# Patient Record
Sex: FEMALE | Race: WHITE | NOT HISPANIC OR LATINO | URBAN - METROPOLITAN AREA
[De-identification: names, ages, dates, MRNs, and addresses within clinical notes are randomized per-mention and may not be internally consistent; named-entity substitution may affect disease eponyms.]

---

## 2020-11-01 ENCOUNTER — EMERGENCY (EMERGENCY)
Facility: HOSPITAL | Age: 35
LOS: 1 days | Discharge: HOME | End: 2020-11-01
Admitting: EMERGENCY MEDICINE
Payer: COMMERCIAL

## 2020-11-01 VITALS
OXYGEN SATURATION: 98 % | RESPIRATION RATE: 18 BRPM | SYSTOLIC BLOOD PRESSURE: 136 MMHG | TEMPERATURE: 98 F | DIASTOLIC BLOOD PRESSURE: 93 MMHG | HEART RATE: 76 BPM

## 2020-11-01 VITALS
HEART RATE: 73 BPM | RESPIRATION RATE: 18 BRPM | TEMPERATURE: 99 F | DIASTOLIC BLOOD PRESSURE: 88 MMHG | OXYGEN SATURATION: 99 % | SYSTOLIC BLOOD PRESSURE: 138 MMHG
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
## 2020-11-01 DIAGNOSIS — Z23 ENCOUNTER FOR IMMUNIZATION: ICD-10-CM

## 2020-11-01 DIAGNOSIS — Z20.828 CONTACT WITH AND (SUSPECTED) EXPOSURE TO OTHER VIRAL COMMUNICABLE DISEASES: ICD-10-CM

## 2020-11-01 DIAGNOSIS — R51.9 HEADACHE, UNSPECIFIED: ICD-10-CM

## 2020-11-01 DIAGNOSIS — R11.0 NAUSEA: ICD-10-CM

## 2020-11-01 DIAGNOSIS — M79.18 MYALGIA, OTHER SITE: ICD-10-CM

## 2020-11-01 DIAGNOSIS — S00.03XA CONTUSION OF SCALP, INITIAL ENCOUNTER: ICD-10-CM
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
## 2020-11-01 PROCEDURE — 73610 X-RAY EXAM OF ANKLE: CPT | Mod: 26,LT

## 2020-11-01 PROCEDURE — 72125 CT NECK SPINE W/O DYE: CPT | Mod: 26

## 2020-11-01 PROCEDURE — 99284 EMERGENCY DEPT VISIT MOD MDM: CPT

## 2020-11-01 PROCEDURE — 72131 CT LUMBAR SPINE W/O DYE: CPT | Mod: 26

## 2020-11-01 PROCEDURE — 70450 CT HEAD/BRAIN W/O DYE: CPT | Mod: 26

## 2020-11-01 RX ORDER — METHOCARBAMOL 500 MG/1
2 TABLET, FILM COATED ORAL
Qty: 30 | Refills: 0
Start: 2020-11-01 | End: 2020-11-05

## 2020-11-01 RX ORDER — TETANUS TOXOID, REDUCED DIPHTHERIA TOXOID AND ACELLULAR PERTUSSIS VACCINE, ADSORBED 5; 2.5; 8; 8; 2.5 [IU]/.5ML; [IU]/.5ML; UG/.5ML; UG/.5ML; UG/.5ML
0.5 SUSPENSION INTRAMUSCULAR ONCE
Refills: 0 | Status: COMPLETED | OUTPATIENT
Start: 2020-11-01 | End: 2020-11-01

## 2020-11-01 RX ORDER — IBUPROFEN 200 MG
600 TABLET ORAL ONCE
Refills: 0 | Status: COMPLETED | OUTPATIENT
Start: 2020-11-01 | End: 2020-11-01

## 2020-11-01 RX ADMIN — Medication 600 MILLIGRAM(S): at 13:42

## 2020-11-01 RX ADMIN — Medication 600 MILLIGRAM(S): at 12:40

## 2020-11-01 RX ADMIN — TETANUS TOXOID, REDUCED DIPHTHERIA TOXOID AND ACELLULAR PERTUSSIS VACCINE, ADSORBED 0.5 MILLILITER(S): 5; 2.5; 8; 8; 2.5 SUSPENSION INTRAMUSCULAR at 12:40

## 2020-11-01 NOTE — ED PROVIDER NOTE - NEUROLOGICAL, MLM
clear and coherent speech, normal cranial nerve exam, normal finger to nose and JORGE ALBERTO.  Steady gait.  No pronator drift.

## 2020-11-01 NOTE — ED ADULT NURSE NOTE - CHPI ED NUR SYMPTOMS NEG
no deformity/no vomiting/no loss of consciousness/no fever/no numbness/no tingling/no weakness/no confusion/no bleeding/no abrasion
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
## 2020-11-01 NOTE — ED PROVIDER NOTE - MUSCULOSKELETAL, MLM
no point spinal tenderness, no stepoff, area of ecchymosis to R paralumbar area, no chest wall tenderness, no flail chest, hip stable, +L ankle edema and tenderness, able to weight bear
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
## 2020-11-01 NOTE — ED PROVIDER NOTE - PATIENT PORTAL LINK FT
You can access the FollowMyHealth Patient Portal offered by [facility] by registering at the following website: [email or web address]. By joining [employer]’s FollowMyHealth portal, you will also be able to view your health information using other applications (apps) compatible with our system.

## 2020-11-02 LAB — SARS-COV-2 RNA SPEC QL NAA+PROBE: SIGNIFICANT CHANGE UP

## 2020-11-22 ENCOUNTER — EMERGENCY (EMERGENCY)
Facility: HOSPITAL | Age: 35
LOS: 1 days | Discharge: HOME | End: 2020-11-22
Admitting: EMERGENCY MEDICINE
Payer: COMMERCIAL

## 2020-11-22 VITALS
TEMPERATURE: 99 F | DIASTOLIC BLOOD PRESSURE: 85 MMHG | OXYGEN SATURATION: 97 % | HEART RATE: 70 BPM | RESPIRATION RATE: 16 BRPM | SYSTOLIC BLOOD PRESSURE: 136 MMHG

## 2020-11-22 DIAGNOSIS — Z20.828 CONTACT WITH AND (SUSPECTED) EXPOSURE TO OTHER VIRAL COMMUNICABLE DISEASES: ICD-10-CM

## 2020-11-22 PROCEDURE — 99283 EMERGENCY DEPT VISIT LOW MDM: CPT

## 2020-12-19 ENCOUNTER — EMERGENCY (EMERGENCY)
Facility: HOSPITAL | Age: 35
LOS: 1 days | Discharge: HOME | End: 2020-12-19
Attending: EMERGENCY MEDICINE | Admitting: EMERGENCY MEDICINE
Payer: COMMERCIAL

## 2020-12-19 VITALS
OXYGEN SATURATION: 97 % | HEART RATE: 70 BPM | RESPIRATION RATE: 18 BRPM | TEMPERATURE: 98 F | DIASTOLIC BLOOD PRESSURE: 85 MMHG | SYSTOLIC BLOOD PRESSURE: 119 MMHG

## 2020-12-19 DIAGNOSIS — Z20.828 CONTACT WITH AND (SUSPECTED) EXPOSURE TO OTHER VIRAL COMMUNICABLE DISEASES: ICD-10-CM

## 2020-12-19 PROCEDURE — 99283 EMERGENCY DEPT VISIT LOW MDM: CPT

## 2020-12-19 NOTE — ED PROVIDER NOTE - OBJECTIVE STATEMENT
35 female, presenting to the ED requesting COVID-19 screening. Patient is currently asymptomatic and has no other medical complaints at this time. Denies fever, chills, chest pain, SOB.

## 2020-12-19 NOTE — ED PROVIDER NOTE - PATIENT PORTAL LINK FT
You can access the FollowMyHealth Patient Portal offered by [facility] by registering at the following website: [email or web address]. By joining [employer]’s FollowMyHealth portal, you will also be able to view your health information using other applications (apps) compatible with our system.

## 2020-12-19 NOTE — ED PROVIDER NOTE - CLINICAL SUMMARY MEDICAL DECISION MAKING FREE TEXT BOX
Asymptomatic patient here for COVID screening. Risk of exposure is very low. Reviewed vitals and testing plan with the patient. Encouraged to download the follow my health radha for test results.

## 2020-12-20 LAB
SARS-COV-2 IGG SERPL QL IA: NEGATIVE — SIGNIFICANT CHANGE UP
SARS-COV-2 IGM SERPL IA-ACNC: <0.1 INDEX — SIGNIFICANT CHANGE UP

## 2021-05-05 ENCOUNTER — APPOINTMENT (OUTPATIENT)
Age: 36
End: 2021-05-05

## 2021-06-05 ENCOUNTER — EMERGENCY (EMERGENCY)
Facility: HOSPITAL | Age: 36
LOS: 1 days | Discharge: HOME | End: 2021-06-05
Admitting: EMERGENCY MEDICINE
Payer: COMMERCIAL

## 2021-06-05 VITALS
DIASTOLIC BLOOD PRESSURE: 88 MMHG | HEART RATE: 92 BPM | RESPIRATION RATE: 18 BRPM | HEIGHT: 67 IN | OXYGEN SATURATION: 97 % | WEIGHT: 130.07 LBS | TEMPERATURE: 99 F | SYSTOLIC BLOOD PRESSURE: 128 MMHG

## 2021-06-05 VITALS
HEART RATE: 104 BPM | DIASTOLIC BLOOD PRESSURE: 93 MMHG | WEIGHT: 139.99 LBS | SYSTOLIC BLOOD PRESSURE: 144 MMHG | OXYGEN SATURATION: 98 % | RESPIRATION RATE: 18 BRPM | TEMPERATURE: 98 F

## 2021-06-05 DIAGNOSIS — Z79.899 OTHER LONG TERM (CURRENT) DRUG THERAPY: ICD-10-CM

## 2021-06-05 DIAGNOSIS — M54.5 LOW BACK PAIN: ICD-10-CM

## 2021-06-05 DIAGNOSIS — Z79.1 LONG TERM (CURRENT) USE OF NON-STEROIDAL ANTI-INFLAMMATORIES (NSAID): ICD-10-CM

## 2021-06-05 DIAGNOSIS — M54.9 DORSALGIA, UNSPECIFIED: ICD-10-CM

## 2021-06-05 LAB
ALBUMIN SERPL ELPH-MCNC: 4.5 G/DL — SIGNIFICANT CHANGE UP (ref 3.4–5)
ALP SERPL-CCNC: 39 U/L — LOW (ref 40–120)
ALT FLD-CCNC: 21 U/L — SIGNIFICANT CHANGE UP (ref 12–42)
ANION GAP SERPL CALC-SCNC: 11 MMOL/L — SIGNIFICANT CHANGE UP (ref 9–16)
APPEARANCE UR: CLEAR — SIGNIFICANT CHANGE UP
AST SERPL-CCNC: 33 U/L — SIGNIFICANT CHANGE UP (ref 15–37)
BACTERIA # UR AUTO: PRESENT /HPF
BILIRUB SERPL-MCNC: 0.9 MG/DL — SIGNIFICANT CHANGE UP (ref 0.2–1.2)
BILIRUB UR-MCNC: NEGATIVE — SIGNIFICANT CHANGE UP
BUN SERPL-MCNC: 15 MG/DL — SIGNIFICANT CHANGE UP (ref 7–23)
CALCIUM SERPL-MCNC: 9.2 MG/DL — SIGNIFICANT CHANGE UP (ref 8.5–10.5)
CHLORIDE SERPL-SCNC: 105 MMOL/L — SIGNIFICANT CHANGE UP (ref 96–108)
CO2 SERPL-SCNC: 26 MMOL/L — SIGNIFICANT CHANGE UP (ref 22–31)
COLOR SPEC: YELLOW — SIGNIFICANT CHANGE UP
CREAT SERPL-MCNC: 1.02 MG/DL — SIGNIFICANT CHANGE UP (ref 0.5–1.3)
DIFF PNL FLD: ABNORMAL
EPI CELLS # UR: ABNORMAL /HPF (ref 0–5)
GLUCOSE SERPL-MCNC: 98 MG/DL — SIGNIFICANT CHANGE UP (ref 70–99)
GLUCOSE UR QL: NEGATIVE — SIGNIFICANT CHANGE UP
HCT VFR BLD CALC: 39.7 % — SIGNIFICANT CHANGE UP (ref 34.5–45)
HGB BLD-MCNC: 13.6 G/DL — SIGNIFICANT CHANGE UP (ref 11.5–15.5)
KETONES UR-MCNC: NEGATIVE — SIGNIFICANT CHANGE UP
LEUKOCYTE ESTERASE UR-ACNC: ABNORMAL
MCHC RBC-ENTMCNC: 32 PG — SIGNIFICANT CHANGE UP (ref 27–34)
MCHC RBC-ENTMCNC: 34.3 GM/DL — SIGNIFICANT CHANGE UP (ref 32–36)
MCV RBC AUTO: 93.4 FL — SIGNIFICANT CHANGE UP (ref 80–100)
NITRITE UR-MCNC: NEGATIVE — SIGNIFICANT CHANGE UP
NRBC # BLD: 0 /100 WBCS — SIGNIFICANT CHANGE UP (ref 0–0)
PH UR: 7 — SIGNIFICANT CHANGE UP (ref 5–8)
PLATELET # BLD AUTO: 258 K/UL — SIGNIFICANT CHANGE UP (ref 150–400)
POTASSIUM SERPL-MCNC: 4.8 MMOL/L — SIGNIFICANT CHANGE UP (ref 3.5–5.3)
POTASSIUM SERPL-SCNC: 4.8 MMOL/L — SIGNIFICANT CHANGE UP (ref 3.5–5.3)
PROT SERPL-MCNC: 7.8 G/DL — SIGNIFICANT CHANGE UP (ref 6.4–8.2)
PROT UR-MCNC: NEGATIVE MG/DL — SIGNIFICANT CHANGE UP
RBC # BLD: 4.25 M/UL — SIGNIFICANT CHANGE UP (ref 3.8–5.2)
RBC # FLD: 11.6 % — SIGNIFICANT CHANGE UP (ref 10.3–14.5)
RBC CASTS # UR COMP ASSIST: < 5 /HPF — SIGNIFICANT CHANGE UP
SODIUM SERPL-SCNC: 142 MMOL/L — SIGNIFICANT CHANGE UP (ref 132–145)
SP GR SPEC: <=1.005 — SIGNIFICANT CHANGE UP (ref 1–1.03)
UROBILINOGEN FLD QL: 0.2 E.U./DL — SIGNIFICANT CHANGE UP
WBC # BLD: 6.6 K/UL — SIGNIFICANT CHANGE UP (ref 3.8–10.5)
WBC # FLD AUTO: 6.6 K/UL — SIGNIFICANT CHANGE UP (ref 3.8–10.5)
WBC UR QL: < 5 /HPF — SIGNIFICANT CHANGE UP

## 2021-06-05 PROCEDURE — 99284 EMERGENCY DEPT VISIT MOD MDM: CPT

## 2021-06-05 RX ORDER — OXYCODONE AND ACETAMINOPHEN 5; 325 MG/1; MG/1
1 TABLET ORAL ONCE
Refills: 0 | Status: DISCONTINUED | OUTPATIENT
Start: 2021-06-05 | End: 2021-06-05

## 2021-06-05 RX ORDER — LIDOCAINE 4 G/100G
1 CREAM TOPICAL ONCE
Refills: 0 | Status: COMPLETED | OUTPATIENT
Start: 2021-06-05 | End: 2021-06-05

## 2021-06-05 RX ORDER — CYCLOBENZAPRINE HYDROCHLORIDE 10 MG/1
1 TABLET, FILM COATED ORAL
Qty: 10 | Refills: 0
Start: 2021-06-05 | End: 2021-06-14

## 2021-06-05 RX ORDER — SODIUM CHLORIDE 9 MG/ML
3 INJECTION INTRAMUSCULAR; INTRAVENOUS; SUBCUTANEOUS ONCE
Refills: 0 | Status: COMPLETED | OUTPATIENT
Start: 2021-06-05 | End: 2021-06-05

## 2021-06-05 RX ORDER — LIDOCAINE 4 G/100G
1 CREAM TOPICAL
Qty: 30 | Refills: 0
Start: 2021-06-05 | End: 2021-07-04

## 2021-06-05 RX ORDER — DIAZEPAM 5 MG
5 TABLET ORAL ONCE
Refills: 0 | Status: DISCONTINUED | OUTPATIENT
Start: 2021-06-05 | End: 2021-06-05

## 2021-06-05 RX ORDER — DIAZEPAM 5 MG
2 TABLET ORAL ONCE
Refills: 0 | Status: DISCONTINUED | OUTPATIENT
Start: 2021-06-05 | End: 2021-06-05

## 2021-06-05 RX ORDER — KETOROLAC TROMETHAMINE 30 MG/ML
30 SYRINGE (ML) INJECTION ONCE
Refills: 0 | Status: DISCONTINUED | OUTPATIENT
Start: 2021-06-05 | End: 2021-06-05

## 2021-06-05 RX ORDER — ACETAMINOPHEN 500 MG
650 TABLET ORAL ONCE
Refills: 0 | Status: COMPLETED | OUTPATIENT
Start: 2021-06-05 | End: 2021-06-05

## 2021-06-05 RX ORDER — IBUPROFEN 200 MG
600 TABLET ORAL ONCE
Refills: 0 | Status: COMPLETED | OUTPATIENT
Start: 2021-06-05 | End: 2021-06-05

## 2021-06-05 RX ORDER — IBUPROFEN 200 MG
1 TABLET ORAL
Qty: 30 | Refills: 0
Start: 2021-06-05

## 2021-06-05 RX ORDER — DEXAMETHASONE 0.5 MG/5ML
10 ELIXIR ORAL ONCE
Refills: 0 | Status: COMPLETED | OUTPATIENT
Start: 2021-06-05 | End: 2021-06-05

## 2021-06-05 RX ADMIN — Medication 600 MILLIGRAM(S): at 05:23

## 2021-06-05 RX ADMIN — Medication 2 MILLIGRAM(S): at 05:23

## 2021-06-05 RX ADMIN — Medication 30 MILLIGRAM(S): at 13:59

## 2021-06-05 RX ADMIN — Medication 650 MILLIGRAM(S): at 14:00

## 2021-06-05 RX ADMIN — LIDOCAINE 1 PATCH: 4 CREAM TOPICAL at 05:24

## 2021-06-05 RX ADMIN — SODIUM CHLORIDE 3 MILLILITER(S): 9 INJECTION INTRAMUSCULAR; INTRAVENOUS; SUBCUTANEOUS at 13:59

## 2021-06-05 RX ADMIN — OXYCODONE AND ACETAMINOPHEN 1 TABLET(S): 5; 325 TABLET ORAL at 13:59

## 2021-06-05 RX ADMIN — LIDOCAINE 1 PATCH: 4 CREAM TOPICAL at 14:36

## 2021-06-05 RX ADMIN — Medication 5 MILLIGRAM(S): at 13:59

## 2021-06-05 RX ADMIN — Medication 10 MILLIGRAM(S): at 13:59

## 2021-06-05 RX ADMIN — Medication 650 MILLIGRAM(S): at 05:24

## 2021-06-05 NOTE — ED PROVIDER NOTE - PROGRESS NOTE DETAILS
Pt tearful.  She states she has the same degree of pain as her initial visit and does not feel any relief.  Pt requesting imaging.  Pt offered xray or CT.  Risk/ radiation vs benefit of imaging discussed.  Pt educated that a MRI is most beneficial when evaluation the spine for atraumatic pain and does not include risk of radiation.  Pt informed a MRI can be obtained non-emergently outpt through specialist or her PCP.

## 2021-06-05 NOTE — ED PROVIDER NOTE - OBJECTIVE STATEMENT
34 y/o F, no PMH, returns to the ED c/o right sided lumbar back pain radiating down right leg since this morning. She was seen in this ED approximately 6 hours prior to arrival and treated for sciatica pain. She was medicated with a Lidocaine patch, Valium, Tylenol, and ibuprofen with no relief. She was subsequently evaluated by her PCP via telehealth visit and advised to return to the ED for imaging of her back. Pt states the way she injured her back during piliates. She denies trauma/falls, fevers, chills, chest pain, cough, SOB, abdominal pain, N/V/D, dysuria, hematuria, weakness, and rash. She endorses numbness to her right leg, but no swelling.

## 2021-06-05 NOTE — ED PROVIDER NOTE - CLINICAL SUMMARY MEDICAL DECISION MAKING FREE TEXT BOX
34 y/o F presents to ED c/o atraumatic R lumbar back pain radiating down R leg with associated subjective numbness.  Normal sensation and strength.  VSS, afebrileNAD.  Labs wnl.  no back pain red flags.  Pt medicated with IV decadron, Toradol, Tylenol, Percocet and Valium PO.  Pt reports improvement of pain.  She is ambulatory and full weight bearing.  UA shows hematuria but states she does not menstruate due to OCPs.  Urine cultures ordered.    Results and diagnosis discussed with patient.  Treatment plan discussed.  Return precautions discussed.  Pt verbalized understanding and given the opportunity to ask questions.  Patient advised to follow up with primary care provider.

## 2021-06-05 NOTE — ED PROVIDER NOTE - PROGRESS NOTE DETAILS
Patient walking around ED. Verbally abusive with staff  Demanding another valium. Patient recently given one.

## 2021-06-05 NOTE — ED PROVIDER NOTE - CLINICAL SUMMARY MEDICAL DECISION MAKING FREE TEXT BOX
Patient here with right sided lumbar back pain with no urinary retention, weakness, or saddle anesthesia  Patient exam wnl   Patient verbally abusive with staff demanding benzo     Patient d/c with return precautions

## 2021-06-05 NOTE — ED PROVIDER NOTE - PATIENT PORTAL LINK FT
You can access the FollowMyHealth Patient Portal offered by [facility] by registering at the following website: [email or web address]. By joining [employer]’s FollowMyHealth portal, you will also be able to view your health information using other applications (apps) compatible with our system.

## 2021-06-05 NOTE — ED PROVIDER NOTE - NSFOLLOWUPINSTRUCTIONS_ED_ALL_ED_FT
Back Pain    Back pain is very common in adults. The cause of back pain is rarely dangerous and the pain often gets better over time. The cause of your back pain may not be known and may include strain of muscles or ligaments, degeneration of the spinal disks, or arthritis. Occasionally the pain may radiate down your leg(s). Over-the-counter medicines to reduce pain and inflammation are often the most helpful. Stretching and remaining active frequently helps the healing process.     SEEK IMMEDIATE MEDICAL CARE IF YOU HAVE ANY OF THE FOLLOWING SYMPTOMS: bowel or bladder control problems, unusual weakness or numbness in your arms or legs, nausea or vomiting, abdominal pain, fever, dizziness/lightheadedness.      Take medications as needed for pain.
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
## 2021-06-05 NOTE — ED PROVIDER NOTE - CHPI ED SYMPTOMS NEG
swelling, no fever, no chills, no chest pain, no cough, no SOB, no abdominal pain, no N/V/D, no dysuria, no hematuria, no rash./no weakness

## 2021-06-05 NOTE — ED PROVIDER NOTE - OBJECTIVE STATEMENT
34 y/o female here with right sided lumbar back pain that radiates to the right leg. Patient appears well NAD stable. Patient states pain began this morning. Thinks she might have tweaked her back during pilates. Denies urinary retention, numbness, weakness. Patient appears well Nad stable. Walking around the without assistance

## 2021-06-05 NOTE — ED PROVIDER NOTE - PATIENT PORTAL LINK FT
You can access the FollowMyHealth Patient Portal offered by [facility] by registering at the following website: [email or web address]. By joining [employer]’s FollowMyHealth portal, you will also be able to view your health information using other applications (apps) compatible with our system.

## 2021-06-05 NOTE — ED ADULT NURSE REASSESSMENT NOTE - COMFORT CARE
provided ice pack as requested by Pt./plan of care explained/wait time explained
plan of care explained/po fluids offered

## 2021-06-05 NOTE — ED ADULT TRIAGE NOTE - CHIEF COMPLAINT QUOTE
Pt walked into ED c.o "sciatic pain". Pt states she was here this morning and was given Advil and a lidocaine patch with no relief. Pt states she called PCP who instructed her to return to ED for imaging. Pt states pain starts in lower back and radiates down left leg. Pt states "I feel like my toes are numb".
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
## 2021-06-05 NOTE — ED PROVIDER NOTE - CARE PLAN
Principal Discharge DX:	Back pain, unspecified back location, unspecified back pain laterality, unspecified chronicity

## 2024-03-20 PROBLEM — Z00.00 ENCOUNTER FOR PREVENTIVE HEALTH EXAMINATION: Status: ACTIVE | Noted: 2024-03-20

## 2024-05-02 NOTE — ED PROVIDER NOTE - CONDITION AT DISCHARGE:
Continue Regimen: folic acid 1 mg tablet \\nTake one pill po QD, pt has plenty, will refill if pt needs\\n\\nMTX 2.5 mg 2 PO Q week\\n\\nHydrocortisone 2.5% kwame  bid prn
Plan: patient defers changing medication as she is stable.
Render In Strict Bullet Format?: No
Detail Level: Zone
Improved

## 2024-08-14 ENCOUNTER — NON-APPOINTMENT (OUTPATIENT)
Age: 39
End: 2024-08-14

## 2025-07-23 NOTE — ED ADULT NURSE NOTE - CHIEF COMPLAINT
well developed, well nourished , in no acute distress , ambulating without difficulty , normal communication ability
The patient is a 35y Female complaining of back pain general.